# Patient Record
Sex: MALE | Race: WHITE | Employment: OTHER
[De-identification: names, ages, dates, MRNs, and addresses within clinical notes are randomized per-mention and may not be internally consistent; named-entity substitution may affect disease eponyms.]

---

## 2017-01-01 ENCOUNTER — HOME CARE VISIT (OUTPATIENT)
Dept: SCHEDULING | Facility: HOME HEALTH | Age: 77
End: 2017-01-01
Payer: MEDICARE

## 2017-01-01 ENCOUNTER — OFFICE VISIT (OUTPATIENT)
Dept: FAMILY MEDICINE CLINIC | Age: 77
End: 2017-01-01

## 2017-01-01 ENCOUNTER — TELEPHONE (OUTPATIENT)
Dept: FAMILY MEDICINE CLINIC | Age: 77
End: 2017-01-01

## 2017-01-01 ENCOUNTER — HOME HEALTH ADMISSION (OUTPATIENT)
Dept: HOME HEALTH SERVICES | Facility: HOME HEALTH | Age: 77
End: 2017-01-01
Payer: MEDICARE

## 2017-01-01 ENCOUNTER — HOME CARE VISIT (OUTPATIENT)
Dept: HOME HEALTH SERVICES | Facility: HOME HEALTH | Age: 77
End: 2017-01-01
Payer: MEDICARE

## 2017-01-01 ENCOUNTER — HOME CARE VISIT (OUTPATIENT)
Dept: HOME HEALTH SERVICES | Facility: HOME HEALTH | Age: 77
End: 2017-01-01

## 2017-01-01 ENCOUNTER — DOCUMENTATION ONLY (OUTPATIENT)
Dept: FAMILY MEDICINE CLINIC | Age: 77
End: 2017-01-01

## 2017-01-01 VITALS
HEART RATE: 119 BPM | TEMPERATURE: 97.6 F | OXYGEN SATURATION: 95 % | RESPIRATION RATE: 16 BRPM | SYSTOLIC BLOOD PRESSURE: 100 MMHG | DIASTOLIC BLOOD PRESSURE: 60 MMHG

## 2017-01-01 VITALS
RESPIRATION RATE: 20 BRPM | DIASTOLIC BLOOD PRESSURE: 60 MMHG | BODY MASS INDEX: 21.22 KG/M2 | OXYGEN SATURATION: 93 % | HEIGHT: 68 IN | SYSTOLIC BLOOD PRESSURE: 128 MMHG | WEIGHT: 140 LBS | HEART RATE: 73 BPM | TEMPERATURE: 98.1 F

## 2017-01-01 VITALS
DIASTOLIC BLOOD PRESSURE: 60 MMHG | TEMPERATURE: 97.5 F | RESPIRATION RATE: 12 BRPM | HEART RATE: 74 BPM | SYSTOLIC BLOOD PRESSURE: 100 MMHG | OXYGEN SATURATION: 96 %

## 2017-01-01 VITALS — SYSTOLIC BLOOD PRESSURE: 100 MMHG | TEMPERATURE: 97.5 F | DIASTOLIC BLOOD PRESSURE: 60 MMHG | HEART RATE: 74 BPM

## 2017-01-01 VITALS
RESPIRATION RATE: 20 BRPM | OXYGEN SATURATION: 95 % | HEART RATE: 87 BPM | TEMPERATURE: 98.1 F | SYSTOLIC BLOOD PRESSURE: 145 MMHG | DIASTOLIC BLOOD PRESSURE: 86 MMHG

## 2017-01-01 VITALS — TEMPERATURE: 97.3 F | HEART RATE: 67 BPM | DIASTOLIC BLOOD PRESSURE: 83 MMHG | SYSTOLIC BLOOD PRESSURE: 142 MMHG

## 2017-01-01 DIAGNOSIS — F33.42 RECURRENT MAJOR DEPRESSIVE DISORDER, IN FULL REMISSION (HCC): ICD-10-CM

## 2017-01-01 DIAGNOSIS — I10 ESSENTIAL HYPERTENSION: Primary | ICD-10-CM

## 2017-01-01 DIAGNOSIS — Z13.6 SCREENING FOR ISCHEMIC HEART DISEASE: ICD-10-CM

## 2017-01-01 DIAGNOSIS — Z79.899 HIGH RISK MEDICATION USE: ICD-10-CM

## 2017-01-01 DIAGNOSIS — F32.A DEPRESSION, UNSPECIFIED DEPRESSION TYPE: ICD-10-CM

## 2017-01-01 DIAGNOSIS — Z91.81 AT HIGH RISK FOR FALLS: ICD-10-CM

## 2017-01-01 DIAGNOSIS — R00.0 TACHYCARDIA: ICD-10-CM

## 2017-01-01 DIAGNOSIS — Z13.1 SCREENING FOR DIABETES MELLITUS: ICD-10-CM

## 2017-01-01 DIAGNOSIS — Z13.39 SCREENING FOR ALCOHOLISM: ICD-10-CM

## 2017-01-01 DIAGNOSIS — R25.1 TREMOR: ICD-10-CM

## 2017-01-01 DIAGNOSIS — I25.10 CORONARY ARTERY DISEASE INVOLVING NATIVE CORONARY ARTERY OF NATIVE HEART WITHOUT ANGINA PECTORIS: ICD-10-CM

## 2017-01-01 DIAGNOSIS — I10 ESSENTIAL HYPERTENSION: ICD-10-CM

## 2017-01-01 DIAGNOSIS — J44.9 CHRONIC OBSTRUCTIVE PULMONARY DISEASE, UNSPECIFIED COPD TYPE (HCC): ICD-10-CM

## 2017-01-01 DIAGNOSIS — Z00.00 ROUTINE GENERAL MEDICAL EXAMINATION AT A HEALTH CARE FACILITY: Primary | ICD-10-CM

## 2017-01-01 DIAGNOSIS — Z23 ENCOUNTER FOR IMMUNIZATION: ICD-10-CM

## 2017-01-01 PROCEDURE — 3331090002 HH PPS REVENUE DEBIT

## 2017-01-01 PROCEDURE — 3331090001 HH PPS REVENUE CREDIT

## 2017-01-01 PROCEDURE — G0156 HHCP-SVS OF AIDE,EA 15 MIN: HCPCS

## 2017-01-01 PROCEDURE — G0299 HHS/HOSPICE OF RN EA 15 MIN: HCPCS

## 2017-01-01 PROCEDURE — 400013 HH SOC

## 2017-01-01 RX ORDER — SERTRALINE HYDROCHLORIDE 50 MG/1
50 TABLET, FILM COATED ORAL DAILY
Qty: 30 TAB | Refills: 0 | Status: SHIPPED | OUTPATIENT
Start: 2017-01-01 | End: 2017-01-01 | Stop reason: SDUPTHER

## 2017-01-01 RX ORDER — PRAVASTATIN SODIUM 20 MG/1
20 TABLET ORAL
COMMUNITY
End: 2017-01-01

## 2017-01-01 RX ORDER — ACETAMINOPHEN 325 MG/1
650 TABLET ORAL
COMMUNITY
End: 2017-01-01

## 2017-01-01 RX ORDER — LORAZEPAM 2 MG/1
0.5 TABLET ORAL EVERY 4 HOURS
Status: CANCELLED | OUTPATIENT
Start: 2017-01-01

## 2017-01-01 RX ORDER — AMLODIPINE BESYLATE 2.5 MG/1
TABLET ORAL DAILY
COMMUNITY
End: 2017-01-01 | Stop reason: ALTCHOICE

## 2017-01-01 RX ORDER — NEBIVOLOL 10 MG/1
TABLET ORAL DAILY
COMMUNITY
End: 2017-01-01 | Stop reason: SDUPTHER

## 2017-01-01 RX ORDER — NEBIVOLOL 5 MG/1
5 TABLET ORAL DAILY
Qty: 30 TAB | Refills: 1 | Status: SHIPPED | OUTPATIENT
Start: 2017-01-01 | End: 2017-01-01 | Stop reason: SDUPTHER

## 2017-01-01 RX ORDER — MORPHINE SULFATE 20 MG/ML
SOLUTION ORAL
Qty: 30 ML | Refills: 0 | Status: CANCELLED | OUTPATIENT
Start: 2017-01-01

## 2017-01-01 RX ORDER — SERTRALINE HYDROCHLORIDE 25 MG/1
25 TABLET, FILM COATED ORAL DAILY
Qty: 30 TAB | Refills: 2 | Status: SHIPPED | OUTPATIENT
Start: 2017-01-01

## 2017-01-01 RX ORDER — AMLODIPINE BESYLATE 100 %
POWDER (GRAM) MISCELLANEOUS
COMMUNITY
End: 2017-01-01 | Stop reason: CLARIF

## 2017-01-01 RX ORDER — NEBIVOLOL 5 MG/1
5 TABLET ORAL DAILY
Qty: 90 TAB | Refills: 1 | Status: SHIPPED | OUTPATIENT
Start: 2017-01-01

## 2017-04-07 NOTE — PROGRESS NOTES
Patient here for f/u on hospital discharge was admitted to [facility] on 3/19/17 for low BP was d/c on 3/20/17. Amlodipine was d/c at hospital. Patient concern that he shakes more than he used to.     1. Have you been to the ER, urgent care clinic since your last visit? Hospitalized since your last visit? Yes When: 3/19/17 Where: [facility] Reason for visit: low BP    2. Have you seen or consulted any other health care providers outside of the [address] [place] [address] since your last visit? Include any pap smears or colon screening.  No

## 2017-04-07 NOTE — PROGRESS NOTES
Bisi Delcid is a 77 y.o. male here with his daughter Mariana Aflaro and her Danelle Hopedale    [place] on [place] [facility]  [place]17-3/20/17 for transient hypotension. Amlodipine was d/c    Additional diagnoses:    Dementia with altered mental status, resolved  Tremors: Parkinson's disease versus essential tremor  CKD stage III unchanged  Hypertension: Plan at discharge was to continue nebivolol  COPD: Stable  History of seizure disorder but off medications for 2 years    Discharge medications (in contrast what is reported by the family below):  Nebivolol 10 mg tablet 1 tablet daily  Pravastatin 20 mg daily  Primidone 50 mg tablet, 1.5 tablet daily  Tamsulosin 0.4 mg capsule extended release daily  Spiriva 18 µg capsule daily    Mariana Alfaro also indicates taking:  gabapentin for nerve pain  zoloft for depression  Albuterol both by MDI and nebulizer  Using Advair \"as needed\"  Suspect [facility]'s actually [facility] indicates a reference to Dr. Dre Graham, neurologist who follows him for a diagnosis of severe central tremors on primidone      \"I don't feel good. \"  No energy  Poor appetite  Stable since discharge     Patient concern that he shakes more than he used to. Soc: transportation issues have improved      Patient Care Team:  Trish Harrington MD as PCP - Shon Parekr MD (Cardiology)  Davie Wharton MD (Nephrology)  Kristen Jacobson MD (Neurology)  No Known Allergies  Outpatient Prescriptions Marked as Taking for the 4/7/17 encounter (Office Visit) with Trish Harrington MD   Medication Sig Dispense Refill    acetaminophen (TYLENOL) 325 mg tablet Take 650 mg by mouth every six (6) hours as needed for Pain.  nebivolol (BYSTOLIC) 10 mg tablet Take  by mouth daily.  pravastatin (PRAVACHOL) 20 mg tablet Take 20 mg by mouth nightly.       atorvastatin (LIPITOR) 40 mg tablet take 1 tablet by mouth once daily 90 Tab 4    sertraline (ZOLOFT) 50 mg tablet Take 1 Tab by mouth daily. 90 Tab 4    metoprolol tartrate (LOPRESSOR) 50 mg tablet Take 1 Tab by mouth two (2) times a day. 180 Tab 4    tamsulosin (FLOMAX) 0.4 mg capsule take 2 capsules by mouth once daily 180 Cap 4    guaiFENesin SR (MUCINEX) 600 mg SR tablet Take 600 mg by mouth two (2) times a day.  ketoconazole (NIZORAL) 2 % topical cream Apply  to affected area two (2) times a day. Indications: FUNGAL INFECTION OF SKIN 15 g 0    esomeprazole (NEXIUM) 20 mg capsule Take  by mouth daily.  albuterol (PROVENTIL HFA, VENTOLIN HFA, PROAIR HFA) 90 mcg/actuation inhaler Take 2 Puffs by inhalation every four (4) hours as needed. 1 Inhaler 4    gabapentin (NEURONTIN) 300 mg capsule Take 2 Caps by mouth nightly. 180 Cap 0    primidone (MYSOLINE) 50 mg tablet Take 1.5 Tabs by mouth two (2) times a day. (Patient taking differently: Take 100 mg by mouth two (2) times a day.) 180 Tab 0    clopidogrel (PLAVIX) 75 mg tablet   0    tiotropium (SPIRIVA WITH HANDIHALER) 18 mcg inhalation capsule Take 1 Cap by inhalation daily. 90 Cap 4    fluticasone-salmeterol (ADVAIR DISKUS) 250-50 mcg/dose diskus inhaler Take 1 Puff by inhalation every twelve (12) hours. 3 Inhaler 4    Inhalational Spacing Device (AEROCHAMBER) 1 Each by Does Not Apply route as needed. 1 Device 0     Patient Active Problem List    Diagnosis    Advanced care planning/counseling discussion    Presence of stent in coronary artery in patient with coronary artery disease    EVELYN (obstructive sleep apnea)    Neck pain    Tremor     4/7/2017: Abstracted from hospital discharge summary [facility] discharge date 3/20/2017 severe central tremors followed by Dr. Anibal Be.   On primidone      Periodic limb movement sleep disorder    Ataxic gait    Polyneuropathy    Memory loss    Carotid stenosis, bilateral    Sleep disorder    Hyponatremia    Phase of life or life circumstance problem    Hoarseness or changing voice    ETOH abuse    Tobacco abuse    Erectile dysfunction of organic origin    Chronic renal failure     Stage III      Peripheral neuropathy (HCC)     alcoholic      Hyperlipidemia LDL goal <70    Restless legs syndrome (RLS)    Allergic rhinitis    Carotid atherosclerosis    Kidney stones     Recurrent      HTN (hypertension)    CAD (coronary artery disease)    COPD (chronic obstructive pulmonary disease) ([facility]     PFTs 9/28/2012       Past Medical History:   Diagnosis Date    Bilateral carotid artery stenosis     right carotid occluded. left stenosis at 50% followed by Dr. Mela Mensah CAD (coronary artery disease) 4/89,6/90    acute MI    COPD     Diabetes mellitus     \"prediabetes\"    Hypercholesterolemia     Hypertension     Pneumonia     3/12 hospitalized at [facility] Pneumonia     Stroke [facility]) 7/09    branch retinal artery occlusion left eye    TIA (transient ischemic attack) 5/8/2015     Past Surgical History:   Procedure Laterality Date    CARDIAC SURG PROCEDURE UNLIST      CHEST SURGERY PROCEDURE UNLISTED  1997    right sided lung biopsy benign    HX HEENT  2005    cataract    VASCULAR SURGERY PROCEDURE UNLIST       Family History   Problem Relation Age of Onset    MS Son     Coronary Artery Disease Mother     Diabetes Mother     Cancer Mother     Heart Disease Mother     Coronary Artery Disease Sister     Heart Disease Sister     Coronary Artery Disease Brother     Cancer Brother      prostate    Heart Disease Brother     Coronary Artery Disease Brother     Parkinsonism Maternal Grandfather      Social History     Social History    Marital status:      Spouse name: N/A    Number of children: N/A    Years of education: N/A     Occupational History    Not on file.      Social History Main Topics    Smoking status: Former Smoker     Packs/day: 2.00     Years: 27.00     Types: Cigarettes     Quit date: 3/25/1980    Smokeless tobacco: Former User Types: Jennifer Corbin     Quit date: 3/25/1980      Comment: still chews 3 containers/week (20 doses each)    Alcohol use No      Comment: quit drinking 1-2 months ago    Drug use: No    Sexual activity: Not on file     Other Topics Concern    Not on file     Social History Narrative         Review of Systems   Constitutional: Negative for fever. Gastrointestinal: Negative for abdominal pain, diarrhea, nausea and vomiting. Genitourinary: Negative for hematuria. Neurological: Negative for loss of consciousness. Visit Vitals    /60 (BP 1 Location: Left arm, BP Patient Position: Sitting)    Pulse (!) 119    Temp 97.6 °F (36.4 °C) (Temporal)    Resp 16    SpO2 95%     Physical Exam   Constitutional: He is oriented to person, place, and time. He appears well-developed. No distress. Pleasant chronically ill-appearing elderly white male in wheelchair   HENT:   Head: Normocephalic and atraumatic. Cardiovascular: Regular rhythm. Tachycardia present. Exam reveals distant heart sounds. No murmur heard. Pulmonary/Chest: Effort normal. No respiratory distress. He has no wheezes. He has no rales. Musculoskeletal: He exhibits no edema. Neurological: He is alert and oriented to person, place, and time. Skin: Skin is warm and dry. Psychiatric: He has a normal mood and affect. His behavior is normal. Judgment and thought content normal.     Results for orders placed or performed in visit on 06/23/16   AMB EXT CREATININE   Result Value Ref Range    Creatinine, External 1.12        ICD-10-CM ICD-9-CM    1. Essential hypertension I10 401.9    2. Tachycardia R00.0 785.0    3. Tremor R25.1 781.0    4. Chronic obstructive pulmonary disease, unspecified COPD type ([facility] J44.9 496      I have asked Mg Wiggins to ensure that they bring all of his medications to revisit.   Meanwhile today, after they get home, she understands she is to line up all of the bottles and compare them to what is noted in the after visit summary. I have asked her to please call the clinic regardless with the results of her findings. Medication change today to decrease the nebivolol from 10 mg to 5 mg daily is based on the assumption that he is only taking the nebivolol and not taking Lopressor in contradiction to her report at the time of medication reconciliation/rooming. The stability of his symptoms since discharge argues against infection. There is a very good chance that improving his blood pressure will result in resolution of the tachycardia and improvement in his appetite. Schedule visit with Dr. Maria L Vazquez to reassess management of tremors    Plan to [facility] that Advair is not a rescue medication. It is to be administered on a scheduled basis every day, like the Spiriva, to prevent symptoms rather than to acutely address them. Mr. Brittfredi Edmond and [facility] understand our medical plan. Alternatives have been explained and offered. The risks, benefits and significant side effects of all medications have been reviewed. All questions have been answered. He is encouraged to employ the information provided in the after visit summary, which was reviewed. [facility] is to call if his condition worsens or fails to improve or if significant side effects are experienced. Follow-up Disposition:  Return in about 1 month (around 5/7/2017) for blood pressure follow up, Medicare Wellness Visit same day, (30). Dragon medical dictation software was used for portions of this report. Unintended voice recognition errors may occur.

## 2017-04-07 NOTE — PATIENT INSTRUCTIONS
Changes today are assuming we are correct that he's only taking Bystolic 10 mg once daily. Schedule visit with Dr. Kasia Parra for tremors    Bring all bottles to every visit.

## 2017-05-12 NOTE — TELEPHONE ENCOUNTER
This patient's wife and daughter contacted office for the following prescriptions to be filled:  (They state he is completely out of this medication)    Medication requested :   Requested Prescriptions     Pending Prescriptions Disp Refills    sertraline (ZOLOFT) 50 mg tablet 90 Tab 4     Sig: Take 1 Tab by mouth daily.      PCP: Dr. Nathan Huff or Print: [facility]  Mail order or Local pharmacy: [phone]    Scheduled appointment if not seen by current providers in office:LOV 4/7/17, next appt 5/19/17

## 2017-05-12 NOTE — TELEPHONE ENCOUNTER
Last RX for Zoloft was 06/30/16 #90 with 4 refills. Patients caregivers state he is completely out. Patient has a medicare wellness check on 05/19/17.  Please advise

## 2017-05-12 NOTE — TELEPHONE ENCOUNTER
Please make sure that he is also scheduled on that same day for blood pressure follow-up (30). Barring there are no additional unforeseen problems, we can plan to follow-up on his depression management at that time. Please make note on the reason for visit so we'll be sure to attend to it that day.   SAMMI

## 2017-05-19 NOTE — ACP (ADVANCE CARE PLANNING)
Advance Care Planning (ACP) Provider Note - Comprehensive     Date of ACP Conversation: 05/19/17  Persons included in Conversation:  patient and family  Length of ACP Conversation in minutes:  <16 minutes (Non-Billable)    Hans Fry (step son) by Serg VALDEZ. General ACP for ALL Patients with Decision Making Capacity:   Exploration of values, goals, and preferences if recovery is not expected, even with continued medical treatment in the event of: Imminent death  Severe, permanent brain injury  \"In these circumstances, what matters most to you? \"  Care focused more on comfort or quality of life. \"What, if any, treatments would you want to avoid? \" chronic intubation     For Serious or Chronic Illness: For Serious or Chronic Illness: The following items were discussed with the patient who verbalized understanding:    Understanding of CPR, goals and expected outcomes, benefits and burdens discussed.   Information on CPR success rates provided (e.g. for CPR in hospital, survival to d/c at two weeks is 22%)    Interventions Provided:  Recommended communicating the plan and making copies for the healthcare agent, personal physician, and others as appropriate (e.g., health system)  SAMMI

## 2017-05-19 NOTE — MR AVS SNAPSHOT
Visit Information Date & Time Provider Department Dept. Phone Encounter #  
 5/19/2017 11:00 AM Aaliyah Tatum MD [facility] [phone] [id number] Follow-up Instructions Return in about 1 year (around 5/19/2018) for Medicare Wellness Visit. Upcoming Health Maintenance Date Due  
 GLAUCOMA SCREENING Q2Y 6/30/2006 MEDICARE YEARLY EXAM 6/30/2006 Pneumococcal 65+ High/Highest Risk (2 of 2 - PPSV23) 2/5/2016 INFLUENZA AGE 9 TO ADULT 8/1/2017 DTaP/Tdap/Td series (2 - Td) 5/12/2023 Allergies as of 5/19/2017  Review Complete On: 5/19/2017 By: Aaliyah Tatum MD  
 No Known Allergies Current Immunizations  Reviewed on 5/26/2015 Name Date Influenza Vaccine Split 10/26/2011 Pneumococcal Conjugate (PCV-13)  Incomplete, 12/11/2015 TD Vaccine 7/25/2010 Tdap 5/12/2013  5:07 PM  
  
 Not reviewed this visit You Were Diagnosed With   
  
 Codes Comments Routine general medical examination at a health care facility    -  Primary ICD-10-CM: Z00.00 ICD-9-CM: V70.0 Screening for alcoholism     ICD-10-CM: Z13.89 ICD-9-CM: V79.1 At high risk for falls     ICD-10-CM: Z91.81 
ICD-9-CM: V15.88 High risk medication use     ICD-10-CM: Z79.899 ICD-9-CM: V58.69 Essential hypertension     ICD-10-CM: I10 
ICD-9-CM: 401.9 Coronary artery disease involving native coronary artery of native heart without angina pectoris     ICD-10-CM: I25.10 ICD-9-CM: 414.01 Encounter for immunization     ICD-10-CM: Y19 ICD-9-CM: V03.89 Screening for diabetes mellitus     ICD-10-CM: Z13.1 ICD-9-CM: V77.1 Screening for ischemic heart disease     ICD-10-CM: Z13.6 ICD-9-CM: V81.0 Vitals BP Pulse Temp Smoking Status 100/60 (BP 1 Location: Left arm, BP Patient Position: Sitting) 74 97.5 °F (36.4 °C) (Oral) Former Smoker Preferred Pharmacy Pharmacy Name Phone [facility] [address] [place] [address], [address] [place] [address] Rd [phone] Your Updated Medication List  
  
   
This list is accurate as of: 5/19/17 12:04 PM.  Always use your most recent med list.  
  
  
  
  
 * albuterol 2.5 mg /3 mL (0.083 %) nebulizer solution Commonly known as:  PROVENTIL VENTOLIN  
3 mL by Nebulization route every four (4) hours as needed for Wheezing. * albuterol 90 mcg/actuation inhaler Commonly known as:  PROVENTIL HFA, VENTOLIN HFA, PROAIR HFA Take 2 Puffs by inhalation every four (4) hours as needed. atorvastatin 40 mg tablet Commonly known as:  LIPITOR  
take 1 tablet by mouth once daily  
  
 clopidogrel 75 mg Tab Commonly known as:  PLAVIX  
  
 COL-RITE 100 mg capsule Generic drug:  docusate sodium Take 100 mg by mouth nightly. fluticasone-salmeterol 250-50 mcg/dose diskus inhaler Commonly known as:  ADVAIR DISKUS Take 1 Puff by inhalation every twelve (12) hours. gabapentin 300 mg capsule Commonly known as:  NEURONTIN Take 2 Caps by mouth nightly. guaiFENesin  mg SR tablet Commonly known as:  [place] [address] Take 600 mg by mouth two (2) times a day. inhalational spacing device Commonly known as:  AEROCHAMBER  
1 Each by Does Not Apply route as needed. nebivolol 5 mg tablet Commonly known as:  BYSTOLIC Take 1 Tab by mouth daily. Nebulizer & Compressor machine 1 Each by Does Not Apply route as directed. NexIUM 20 mg capsule Generic drug:  esomeprazole Take  by mouth daily. nitroglycerin 0.4 mg SL tablet Commonly known as:  NITROSTAT  
1 Tab by SubLINGual route every five (5) minutes as needed. Up to 3 doses. Then call 911 if continues to have pain. primidone 50 mg tablet Commonly known as: MYSOLINE Take 1.5 Tabs by mouth two (2) times a day. sertraline 25 mg tablet Commonly known as:  ZOLOFT Take 1 Tab by mouth daily. SLEEP AID (DOXYLAMINE) PO Take  by mouth. tamsulosin 0.4 mg capsule Commonly known as:  FLOMAX  
take 2 capsules by mouth once daily  
  
 tiotropium 18 mcg inhalation capsule Commonly known as:  [address] Take 1 Cap by inhalation daily. * Notice: This list has 2 medication(s) that are the same as other medications prescribed for you. Read the directions carefully, and ask your doctor or other care provider to review them with you. We Performed the Following ADMIN PNEUMOCOCCAL VACCINE [ [facility]] PNEUMOCOCCAL CONJ VACCINE 13 VALENT IM [id number] CPT(R)] [address] Comments:  
 Homebound patient unable to attend to ADLs. High risk for falls Multiple admissions in the past 2 years. Please perform home safety assessment and ameliorate. Please initiate home PT/OT. Also having teouble understanding proper use of medications and therefore not complying with therapy. Follow-up Instructions Return in about 1 year (around 5/19/2018) for Medicare Wellness Visit. To-Do List   
 05/19/2017 Lab:  GLUCOSE, RANDOM   
  
 05/19/2017 Lab:  LIPID PANEL   
  
 05/19/2017 Lab:  METABOLIC PANEL, BASIC Referral Information Referral ID Referred By Referred To  
  
 [id number] Ingris Hayden Not Available Visits Status Start Date End Date 1 New Request 5/19/17 5/19/18 If your referral has a status of pending review or denied, additional information will be sent to support the outcome of this decision. Patient Instructions Medicare Wellness Visit, Male The best way to improve and maintain good health is to have a healthy lifestyle by eating a well-balanced diet, exercising regularly, limiting alcohol and stopping smoking. Regular visits with your physician or non-physician health care provider also support your good health.  Preventive screening tests can find health problems before they become diseases or illnesses. Preventive services such as immunizations prevent serious infections. All people over age 72 should have a Pneumovax and a Prevnar-13 shot to prevent potentially life threatening infections with the pneumococcus bacteria, a common cause of pneumonia. These are once in a lifetime unless you and your provider decide differently. All people over 65 should have a yearly influenza vaccine or \"flu\" shot. This does not prevent infection with cold viruses but has been proven to prevent hospitalization and death from influenza. Although Medicare part B \"regular Medicare\" currently only covers tetanus vaccination in the context of an injury, a tetanus vaccine (Tdap or Td) is recommended every 10 years. A shingles vaccine is recommended once in a lifetime after age 61. The Shingles vaccine is also not covered by Medicare part B. Note, however, that both the Shingles vaccine and Tdap/Td are generally covered by secondary carriers. Please check your coverage and out of pocket expenses. Consider contacting your local health department because it may stock these vaccines for a reasonable charge. We currently have documentation of the following immunization history for you: 
Immunization History Administered Date(s) Administered  Influenza Vaccine Split 10/26/2011  Pneumococcal Conjugate (PCV-13) 12/11/2015  TD Vaccine 07/25/2010  Tdap 05/12/2013 Screening for infection with Hepatitis C is recommended for anyone born between [birth year] through [address]. The table at the bottom of this document indicates the status of this and other screening services. Screening for diabetes mellitus with a blood sugar test (glucose) should be done at least every 3 years until age 79. You and your health care provider may decide whether to continue screening after age 79. The most recent blood glucose we have on file for you is:  
Lab Results Component Value Date/Time Glucose 99 12/31/2014 03:30 PM  
 
 
Glaucoma is a disease of the eye due to increased ocular pressure that can lead to blindness. People with risk factors for glaucoma ( race, diabetes, family history) should consider screening at least every 2 years by an eye professional. This may be covered annually if indicated as determined by you and your doctor. Cardiovascular screening tests that check for elevated lipids or cholesterol (fatty part of blood) which can lead to heart disease and strokes should be done every 4-6 years through age 79. You and your health care provider may decide whether to continue screening after age 79. The most recent lipid panel we have on file for you is:  
Lab Results Component Value Date/Time Cholesterol, total 173 02/22/2012 08:35 AM  
 HDL Cholesterol 60 02/22/2012 08:35 AM  
 LDL, calculated 60 02/22/2012 08:35 AM  
 VLDL, calculated 53 02/22/2012 08:35 AM  
 Triglyceride 265 02/22/2012 08:35 AM  
 CHOL/HDL Ratio 2.4 10/06/2010 08:46 AM  
 
 
Colorectal cancer screening that evaluates for blood or polyps in your colon for people with average risk should be done yearly as a stool test, every five years as a flexible sigmoidoscope or every 10 years as a colonoscopy up to age 76. You and your health care provider may decide whether to continue screening after age 76. Men up to age 76 may elect to screen for prostate cancer with a blood test called a PSA at certain intervals, depending on their personal and family history. This decision is between the patient and his provider. The most recent PSA values we have on file for you are: No results found for: PSA, Michelle Singer, PSAR3, [id number], [id number], PSALT You have declined, which is reasonable If you have been a smoker or had family history of abdominal aortic aneurysms, you and your provider may decide to schedule an ultrasound test of your aorta. Our records show this was done on:  
 
People who have smoked the equivalent of 1 pack per day for 30 years or more may benefit from screening for lung cancer with a yearly low dose CT scan until they have been non smokers for 15 years or competing health conditions render this unlikely to be beneficial. You do not need this because you quit so long ago. Your Medicare Wellness Exam is recommended annually. Here is a list of your current Health Maintenance items with a due date: 
Health Maintenance Topic Date Due  GLAUCOMA SCREENING Q2Y  06/30/2006  MEDICARE YEARLY EXAM  06/30/2006  HEMOGLOBIN A1C Q6M  06/30/2015  
 FOOT EXAM Q1  12/31/2015  MICROALBUMIN Q1  12/31/2015  
 EYE EXAM RETINAL OR DILATED Q1  12/31/2015  Pneumococcal 65+ High/Highest Risk (2 of 2 - PPSV23) 02/05/2016  LIPID PANEL Q1  05/09/2016  INFLUENZA AGE 9 TO ADULT  08/01/2017  
 DTaP/Tdap/Td series (2 - Td) 05/12/2023  ZOSTER VACCINE AGE 60>  Completed Please provide copies of your advanced care documents Introducing [facility] SERVICES! Sabine Peggy introduces [employer] patient portal. Now you can access parts of your medical record, email your doctor's office, and request medication refills online. 1. In your internet browser, go to [email or web address] 2. Click on the First Time User? Click Here link in the Sign In box. You will see the New Member Sign Up page. 3. Enter your [employer] Access Code exactly as it appears below. You will not need to use this code after youve completed the sign-up process. If you do not sign up before the expiration date, you must request a new code. · [employer] Access Code: [email or web address] Expires: 8/17/2017 11:54 AM 
 
4. Enter the last four digits of your Social Security Number (xxxx) and Date of Birth (mm/dd/yyyy) as indicated and click Submit. You will be taken to the next sign-up page. 5. Create a [employer] ID. This will be your [employer] login ID and cannot be changed, so think of one that is secure and easy to remember. 6. Create a [employer] password. You can change your password at any time. 7. Enter your Password Reset Question and Answer. This can be used at a later time if you forget your password. 8. Enter your e-mail address. You will receive e-mail notification when new information is available in [address] 9. Click Sign Up. You can now view and download portions of your medical record. 10. Click the Download Summary menu link to download a portable copy of your medical information. If you have questions, please visit the Frequently Asked Questions section of the [employer] website. Remember, [employer] is NOT to be used for urgent needs. For medical emergencies, dial 911. Now available from your iPhone and Android! Please provide this summary of care documentation to your next provider. Your primary care clinician is listed as Lionel Gonzalez. If you have any questions after today's visit, please call [phone].

## 2017-05-19 NOTE — PATIENT INSTRUCTIONS
Medicare Wellness Visit, Male    The best way to improve and maintain good health is to have a healthy lifestyle by eating a well-balanced diet, exercising regularly, limiting alcohol and stopping smoking. Regular visits with your physician or non-physician health care provider also support your good health. Preventive screening tests can find health problems before they become diseases or illnesses. Preventive services such as immunizations prevent serious infections. All people over age 72 should have a Pneumovax and a Prevnar-13 shot to prevent potentially life threatening infections with the pneumococcus bacteria, a common cause of pneumonia. These are once in a lifetime unless you and your provider decide differently. All people over 65 should have a yearly influenza vaccine or \"flu\" shot. This does not prevent infection with cold viruses but has been proven to prevent hospitalization and death from influenza. Although Medicare part B \"regular Medicare\" currently only covers tetanus vaccination in the context of an injury, a tetanus vaccine (Tdap or Td) is recommended every 10 years. A shingles vaccine is recommended once in a lifetime after age 61. The Shingles vaccine is also not covered by Medicare part B. Note, however, that both the Shingles vaccine and Tdap/Td are generally covered by secondary carriers. Please check your coverage and out of pocket expenses. Consider contacting your local health department because it may stock these vaccines for a reasonable charge. We currently have documentation of the following immunization history for you:  Immunization History   Administered Date(s) Administered    Influenza Vaccine Split 10/26/2011    Pneumococcal Conjugate (PCV-13) 12/11/2015    TD Vaccine 07/25/2010    Tdap 05/12/2013       Screening for infection with Hepatitis C is recommended for anyone born between [birth year] through [address].  The table at the bottom of this document indicates the status of this and other screening services. Screening for diabetes mellitus with a blood sugar test (glucose) should be done at least every 3 years until age 79. You and your health care provider may decide whether to continue screening after age 79. The most recent blood glucose we have on file for you is:   Lab Results   Component Value Date/Time    Glucose 99 12/31/2014 03:30 PM       Glaucoma is a disease of the eye due to increased ocular pressure that can lead to blindness. People with risk factors for glaucoma ( race, diabetes, family history) should consider screening at least every 2 years by an eye professional. This may be covered annually if indicated as determined by you and your doctor. Cardiovascular screening tests that check for elevated lipids or cholesterol (fatty part of blood) which can lead to heart disease and strokes should be done every 4-6 years through age 79. You and your health care provider may decide whether to continue screening after age 79. The most recent lipid panel we have on file for you is:   Lab Results   Component Value Date/Time    Cholesterol, total 173 02/22/2012 08:35 AM    HDL Cholesterol 60 02/22/2012 08:35 AM    LDL, calculated 60 02/22/2012 08:35 AM    VLDL, calculated 53 02/22/2012 08:35 AM    Triglyceride 265 02/22/2012 08:35 AM    CHOL/HDL Ratio 2.4 10/06/2010 08:46 AM       Colorectal cancer screening that evaluates for blood or polyps in your colon for people with average risk should be done yearly as a stool test, every five years as a flexible sigmoidoscope or every 10 years as a colonoscopy up to age 76. You and your health care provider may decide whether to continue screening after age 76. Men up to age 76 may elect to screen for prostate cancer with a blood test called a PSA at certain intervals, depending on their personal and family history. This decision is between the patient and his provider.  The most recent PSA values we have on file for you are:  No results found for: PSA, PSA2, Alexa Loya, [id number], [id number], PSALT  You have declined, which is reasonable    If you have been a smoker or had family history of abdominal aortic aneurysms, you and your provider may decide to schedule an ultrasound test of your aorta. Our records show this was done on:     People who have smoked the equivalent of 1 pack per day for 30 years or more may benefit from screening for lung cancer with a yearly low dose CT scan until they have been non smokers for 15 years or competing health conditions render this unlikely to be beneficial. You do not need this because you quit so long ago. Your Medicare Wellness Exam is recommended annually.     Here is a list of your current Health Maintenance items with a due date:  Health Maintenance   Topic Date Due    GLAUCOMA SCREENING Q2Y  06/30/2006    MEDICARE YEARLY EXAM  06/30/2006    HEMOGLOBIN A1C Q6M  06/30/2015    FOOT EXAM Q1  12/31/2015    MICROALBUMIN Q1  12/31/2015    EYE EXAM RETINAL OR DILATED Q1  12/31/2015    Pneumococcal 65+ High/Highest Risk (2 of 2 - PPSV23) 02/05/2016    LIPID PANEL Q1  05/09/2016    INFLUENZA AGE 9 TO ADULT  08/01/2017    DTaP/Tdap/Td series (2 - Td) 05/12/2023    ZOSTER VACCINE AGE 60>  Completed         Please provide copies of your advanced care documents

## 2017-05-19 NOTE — PROGRESS NOTES
Patient brought medication bottles to his appointment today. Dr. Tamra Sanchez went over medications with patient. Patient had Amlodipine Besylate 2.5 mg tablets 90 blue [place] pills. Per Dr. Tamra Sanchez dispose of tablets. Put pills in the [employer] box witnessed by Jose Armando Peña LPN.

## 2017-05-19 NOTE — PROGRESS NOTES
1. Have you been to the ER, urgent care clinic since your last visit? Hospitalized since your last visit? No    2. Have you seen or consulted any other health care providers outside of the [address] [place] [address] since your last visit? Include any pap smears or colon screening. No    This is a Subsequent Medicare Annual Wellness Visit providing Personalized Prevention Plan Services (PPPS) (Performed 12 months after initial AWV and PPPS )    I have reviewed the patient's medical history in detail and updated the computerized patient record. History     Past Medical History:   Diagnosis Date    Bilateral carotid artery stenosis     right carotid occluded. left stenosis at 50% followed by Dr. Florecita Pina CAD (coronary artery disease) 4/89,6/90    acute MI    COPD     Diabetes mellitus     \"prediabetes\"    Hypercholesterolemia     Hypertension     Pneumonia     3/12 hospitalized at [facility] Pneumonia     Stroke [facility]) 7/09    branch retinal artery occlusion left eye    TIA (transient ischemic attack) 5/8/2015      Past Surgical History:   Procedure Laterality Date    CARDIAC SURG PROCEDURE UNLIST     Patrick Maza 89    right sided lung biopsy benign    HX HEENT  2005    cataract    VASCULAR SURGERY PROCEDURE UNLIST       Current Outpatient Prescriptions   Medication Sig Dispense Refill    DOXYLAMINE SUCCINATE (SLEEP AID, DOXYLAMINE, PO) Take  by mouth.  amLODIPine (NORVASC) 2.5 mg tablet Take  by mouth daily.  sertraline (ZOLOFT) 50 mg tablet Take 1 Tab by mouth daily. 30 Tab 0    nebivolol (BYSTOLIC) 5 mg tablet Take 1 Tab by mouth daily. 30 Tab 1    atorvastatin (LIPITOR) 40 mg tablet take 1 tablet by mouth once daily 90 Tab 4    tamsulosin (FLOMAX) 0.4 mg capsule take 2 capsules by mouth once daily 180 Cap 4    guaiFENesin SR (MUCINEX) 600 mg SR tablet Take 600 mg by mouth two (2) times a day.       esomeprazole (NEXIUM) 20 mg capsule Take  by mouth daily.      gabapentin (NEURONTIN) 300 mg capsule Take 2 Caps by mouth nightly. 180 Cap 0    primidone (MYSOLINE) 50 mg tablet Take 1.5 Tabs by mouth two (2) times a day. (Patient taking differently: Take 100 mg by mouth two (2) times a day.) 180 Tab 0    acetaminophen (TYLENOL) 325 mg tablet Take 650 mg by mouth every six (6) hours as needed for Pain.  ketoconazole (NIZORAL) 2 % topical cream Apply  to affected area two (2) times a day. Indications: FUNGAL INFECTION OF SKIN 15 g 0    nitroglycerin (NITROSTAT) 0.4 mg SL tablet 1 Tab by SubLINGual route every five (5) minutes as needed. Up to 3 doses. Then call 911 if continues to have pain. 1 Bottle 1    albuterol (PROVENTIL HFA, VENTOLIN HFA, PROAIR HFA) 90 mcg/actuation inhaler Take 2 Puffs by inhalation every four (4) hours as needed. 1 Inhaler 4    clopidogrel (PLAVIX) 75 mg tablet   0    tiotropium (SPIRIVA WITH HANDIHALER) 18 mcg inhalation capsule Take 1 Cap by inhalation daily. 90 Cap 4    fluticasone-salmeterol (ADVAIR DISKUS) 250-50 mcg/dose diskus inhaler Take 1 Puff by inhalation every twelve (12) hours. 3 Inhaler 4    albuterol (PROVENTIL VENTOLIN) 2.5 mg /3 mL (0.083 %) nebulizer solution 3 mL by Nebulization route every four (4) hours as needed for Wheezing. 24 Each 2    docusate sodium (COL-RITE) 100 mg capsule Take 100 mg by mouth nightly.  Nebulizer & Compressor machine 1 Each by Does Not Apply route as directed. 1 Each 0    Inhalational Spacing Device (AEROCHAMBER) 1 Each by Does Not Apply route as needed.  1 Device 0     No Known Allergies  Family History   Problem Relation Age of Onset   [facility] MS Son     Coronary Artery Disease Mother     Diabetes Mother    [facility] Cancer Mother     Heart Disease Mother     Coronary Artery Disease Sister     Heart Disease Sister     Coronary Artery Disease Brother     Cancer Brother      prostate    Heart Disease Brother     Coronary Artery Disease Brother     Parkinsonism Maternal Grandfather      Social History   Substance Use Topics    Smoking status: Former Smoker     Packs/day: 2.00     Years: 27.00     Types: Cigarettes     Quit date: 3/25/1980    Smokeless tobacco: Former User     Types: [address] date: 3/25/1980      Comment: still chews 3 containers/week (20 doses each)    Alcohol use No      Comment: quit drinking 1-2 months ago     Patient Active Problem List   Diagnosis Code    HTN (hypertension) I10    CAD (coronary artery disease) I25.10    COPD (chronic obstructive pulmonary disease) (HCC) J44.9    Restless legs syndrome (RLS) G25.81    Allergic rhinitis J30.9    Carotid atherosclerosis I65.29    Kidney stones N20.0    Hyperlipidemia LDL goal <70 E78.5    Peripheral neuropathy (HCC) G62.9    Chronic renal failure N18.9    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    ETOH abuse F10.10    Tobacco abuse Z72.0    Phase of life or life circumstance problem Z65.8    Hoarseness or changing voice R49.9    Hyponatremia E87.1    Ataxic gait R26.0    Polyneuropathy G62.9    Memory loss R41.3    Carotid stenosis, bilateral I65.23    Sleep disorder G47.9    Tremor R25.1    Periodic limb movement sleep disorder G47.61    Neck pain M54.2    EVELYN (obstructive sleep apnea) G47.33    Presence of stent in coronary artery in patient with coronary artery disease I25.10, Z95.5    Advanced care planning/counseling discussion Z71.89       Depression Risk Factor Screening:     PHQ over the last two weeks 5/19/2017   PHQ Not Done Active Diagnosis of Depression or Bipolar Disorder     Alcohol Risk Factor Screening: On any occasion during the past 3 months, have you had more than 4 drinks containing alcohol? No    Do you average more than 14 drinks per week? No      Functional Ability and Level of Safety:     Hearing Loss   Patient did not acknowledge that he heard the whisper. Activities of Daily Living   Total assistance.    Requires assistance with: ambulation, bathing and hygiene, feeding, continence, grooming, toileting and dressing    Fall Risk     Fall Risk Assessment, last 12 mths 5/19/2017   Able to walk? No   Fall in past 12 months? -   Number of falls in past 12 months -     Abuse Screen   Patient is not abused    Review of Systems   Not required    Physical Examination     Evaluation of Cognitive Function:  Mood/affect:  neutral  Appearance: age appropriate and within normal Limits  Family member/caregiver input: here with MYNOR Rey [employer]ues. No concerns expressed. Patient Care Team:  Ai Morris MD as PCP - Anthony Lopes MD (Cardiology)  Wily Desouza MD (Nephrology)  Halle Parra MD (Neurology)    Advice/Referrals/Counseling   Education and counseling provided:  Patient states he has an advanced directive filled out and will get a copy to the office. Assessment/Plan       ICD-10-CM ICD-9-CM    1. Routine general medical examination at a health care facility Z00.00 V70.0    2. Screening for alcoholism Z13.89 V79.1    3. At high risk for falls Z91.81 V15.88 [facility]   4. High risk medication use Z79.899 V58.69 REFERRAL TO HOME HEALTH   7. Encounter for immunization Z23 V03.89 ADMIN PNEUMOCOCCAL VACCINE      PNEUMOCOCCAL CONJ VACCINE 13 VALENT IM   8. Screening for diabetes mellitus Z13.1 V77.1 GLUCOSE, RANDOM   9. Screening for ischemic heart disease Z13.6 V81.0 LIPID PANEL     The patient understands our medical plan. Alternatives have been explained and offered. All questions have been answered. He is encouraged to employ the information provided in the after visit summary, which was reviewed. He is instructed to call the clinic if he has not been notified either by phone or through [address] with the results of his tests or with an appointment plan for any referrals within 1 week(s). No news is not good news; it's no news.      Follow-up Disposition:  Return in about 1 year (around 5/19/2018) for Medicare Wellness Visit.

## 2017-05-19 NOTE — PROGRESS NOTES
Patient here for f/u on his HTN he is asking for refills today. 1. Have you been to the ER, urgent care clinic since your last visit? Hospitalized since your last visit? No    2. Have you seen or consulted any other health care providers outside of the [address] [place] [address] since your last visit? Include any pap smears or colon screening.  No

## 2017-05-19 NOTE — PROGRESS NOTES
Linda Mendes is a 77 y.o. male here with his son in law, Dipti Bolton, and his grandson    1. Follow-up hypertension. At last visit noted to have decreased energy in conjunction with relatively low blood pressure and tachycardia. Beta-blocker dose was halved. Reports improved energy. That visit had occurred in the wake of her hospitalization at which point his amlodipine was discontinued. He brings his medication bottles with him today and amlodipine remains among them. 2.  Follow-up depression on sertraline. Asymptomatic. In last 2 weeks  How often low in spirits? Not at all  How often little interest or pleasure? Not at all    3. Follow-up coronary artery disease. At one point he had been on Plavix. This is not among his current medications. It is unclear whether the medication was discontinued or if he has simply been lost to follow-up. He recalls that at his most recent visit with Dr. Rubin Morse a one-year follow-up was advised. He does not recall when that was. 4.  Follow-up COPD. Not using any inhalers on a scheduled basis, only as needed. Soc: homebound    Patient Care Team:  Prasanna Harris MD as PCP - Lisbeth Vargas MD (Cardiology)  Carlos Ball MD (Nephrology)  Malka Lepe MD (Neurology)  No Known Allergies  Outpatient Prescriptions Marked as Taking for the 5/19/17 encounter (Office Visit) with Prasanna Harris MD   Medication Sig Dispense Refill    DOXYLAMINE SUCCINATE (SLEEP AID, DOXYLAMINE, PO) Take  by mouth.  amLODIPine (NORVASC) 2.5 mg tablet Take  by mouth daily.  sertraline (ZOLOFT) 50 mg tablet Take 1 Tab by mouth daily. 30 Tab 0    nebivolol (BYSTOLIC) 5 mg tablet Take 1 Tab by mouth daily.  30 Tab 1    atorvastatin (LIPITOR) 40 mg tablet take 1 tablet by mouth once daily 90 Tab 4    tamsulosin (FLOMAX) 0.4 mg capsule take 2 capsules by mouth once daily 180 Cap 4    guaiFENesin SR (MUCINEX) 600 mg SR tablet Take 600 mg by mouth two (2) times a day.      esomeprazole (NEXIUM) 20 mg capsule Take  by mouth daily.  gabapentin (NEURONTIN) 300 mg capsule Take 2 Caps by mouth nightly. 180 Cap 0    primidone (MYSOLINE) 50 mg tablet Take 1.5 Tabs by mouth two (2) times a day. (Patient taking differently: Take 100 mg by mouth two (2) times a day.) 180 Tab 0     Patient Active Problem List    Diagnosis    Advanced care planning/counseling discussion    Presence of stent in coronary artery in patient with coronary artery disease    EVELYN (obstructive sleep apnea)    Neck pain    Tremor     4/7/2017: Abstracted from hospital discharge summary [facility] discharge date 3/20/2017 severe central tremors followed by Dr. Maria L Lauren On primidone      Periodic limb movement sleep disorder    Ataxic gait    Polyneuropathy    Memory loss    Carotid stenosis, bilateral    Sleep disorder    Hyponatremia    Hoarseness or changing voice    History of ETOH abuse    Tobacco abuse    Erectile dysfunction of organic origin    Chronic renal failure     Stage III      Peripheral neuropathy (HCC)     alcoholic      Hyperlipidemia LDL goal <70    Restless legs syndrome (RLS)    Allergic rhinitis    Carotid atherosclerosis    Kidney stones     Recurrent      HTN (hypertension)    CAD (coronary artery disease)    COPD (chronic obstructive pulmonary disease) ([address]     PFTs 9/28/2012       Past Medical History:   Diagnosis Date    Bilateral carotid artery stenosis     right carotid occluded.  left stenosis at 50% followed by Dr. Dail Merlin CAD (coronary artery disease) 4/89,6/90    acute MI    COPD     Diabetes mellitus     \"prediabetes\"    Hypercholesterolemia     Hypertension     Pneumonia     3/12 hospitalized at [facility] Pneumonia     Stroke [facility]) 7/09    branch retinal artery occlusion left eye    TIA (transient ischemic attack) 5/8/2015     Past Surgical History:   Procedure Laterality Date    CARDIAC SURG PROCEDURE UNLIST      CHEST SURGERY PROCEDURE UNLISTED  1997    right sided lung biopsy benign    HX HEENT  2005    cataract    VASCULAR SURGERY PROCEDURE UNLIST       Family History   Problem Relation Age of Onset    MS Son     Coronary Artery Disease Mother     Diabetes Mother     Cancer Mother     Heart Disease Mother     Coronary Artery Disease Sister     Heart Disease Sister     Coronary Artery Disease Brother     Cancer Brother      prostate    Heart Disease Brother     Coronary Artery Disease Brother     Parkinsonism Maternal Grandfather      Social History     Social History    Marital status:      Spouse name: N/A    Number of children: N/A    Years of education: N/A     Occupational History    Not on file. Social History Main Topics    Smoking status: Former Smoker     Packs/day: 2.00     Years: 27.00     Types: Cigarettes     Quit date: 3/25/1980    Smokeless tobacco: Former User     Types: Chew     Quit date: 3/25/1980      Comment: still chews 3 containers/week (20 doses each)    Alcohol use No      Comment: quit drinking 1-2 months ago    Drug use: No    Sexual activity: Not on file     Other Topics Concern    Not on file     Social History Narrative         Review of Systems   Respiratory: Positive for shortness of breath (\"sometimes\"). Cardiovascular: Negative for chest pain. Visit Vitals    /60 (BP 1 Location: Left arm, BP Patient Position: Sitting)    Pulse 74    Temp 97.5 °F (36.4 °C) (Oral)    Resp 12    SpO2 96%     Physical Exam   Constitutional: He is oriented to person, place, and time. He appears well-developed. No distress. Pleasant elderly white male in a wheelchair. Appears in far better general condition than at our previous recent visits. HENT:   Head: Normocephalic and atraumatic. Pulmonary/Chest: Effort normal.   Neurological: He is alert and oriented to person, place, and time. Psychiatric: He has a normal mood and affect.  His behavior is normal. Judgment and thought content normal.     Results for orders placed or performed in visit on 06/23/16   AMB EXT CREATININE   Result Value Ref Range    Creatinine, External 1.12        ICD-10-CM ICD-9-CM    1. Essential hypertension I10 401.9    2. Recurrent major depressive disorder, in full remission ([facility] [[email or web address] 75.) F33.42 296.36 sertraline (ZOLOFT) 25 mg tablet   3. Coronary artery disease involving native coronary artery of native heart without angina pectoris I25.10 414.01    4. Chronic obstructive pulmonary disease, unspecified COPD type ([facility] [[email or web address] 75.) J44.9 496        Tachycardia: Resolved  Hypertension: Blood pressure remains low. Discarding his amlodipine. Continue nebivolol unchanged. CAD: Continue statin, aspirin, beta-blocker. Return to Dr. Laura Dickey. Plavix determination to be made by Dr. Laura Dickey. Can now dx MDD in absence in ETOH. Unclear if resolved or well controlled. Given options, electing trial reduced dose SSRI. If clearly declines, call to return to 50 mg dose. COPD: uncontrolled. Not using inhalers properly. Explained controller vs rescue. The patient understands our medical plan. Alternatives have been explained and offered. The risks, benefits and significant side effects of all medications have been reviewed. All questions have been answered. He is encouraged to employ the information provided in the after visit summary, which was reviewed. He is instructed to call the clinic if he has not been notified either by phone or through [address] with the results of his tests or with an appointment plan for any referrals within 1 week(s). No news is not good news; it's no news. The patient  is to call if his condition worsens or fails to improve or if significant side effects are experienced. Follow-up Disposition:  Return in about 3 months (around 8/19/2017) for blood pressure and depression follow up, (30).      Dragon medical dictation software was used for portions of this report. Unintended voice recognition errors may occur.

## 2017-05-22 NOTE — PROGRESS NOTES
Patient given Prevnar 13 injection. Patient tolerated procedure well. Injection given in the left arm.

## 2017-06-20 NOTE — TELEPHONE ENCOUNTER
Representative from [address] [place] [address] Vazquez called to ask if Dr. Natalya Wood would be willing to follow Mr. Anshul Jett under hospice. I asked Dr. Natalya Wood and she agreed. Information was given to the representative. She will be in touch with the office once she receives the consent forms back.

## 2017-06-21 NOTE — TELEPHONE ENCOUNTER
Des Gruber with [phone] called and is asking for the nurse to call her back at [phone] to confirm some diagnosis and discuss some medication orders.

## 2017-06-21 NOTE — TELEPHONE ENCOUNTER
[facility] nurse she needed clarification on DX. Patients wife stated he has Alzheimer, but nurse says notes say Parkinson and Dementia. Nettie Navarro was just calling for clarification. Also she stated she needs an order the the comfort care medications to keep in the house. The medications being requested are pended for you to review.

## 2017-06-22 NOTE — TELEPHONE ENCOUNTER
Dementia was noted on a hospital discharge summary. To my knowledge, the etiology of the dementia has not been determined. Severe central tremors previously evaluated and managed by Dr. Judd Hopper. I am confident it was determined that he does not have Parkinson's disease. I haven't seen any documentation on the terminal illness meriting enrollment in hospice or what symptoms the pended meds are for.    SAMMI

## 2017-06-23 NOTE — TELEPHONE ENCOUNTER
Called Antonina Fabian to inform her of Dr. Shemar Fernandes message. No answer left a message for a return call.

## 2017-06-26 NOTE — TELEPHONE ENCOUNTER
Called Fred Ribeiro and she will change the medication usage to Tremors. She is going to fax the notes and orders back to our office.

## 2017-07-28 NOTE — TELEPHONE ENCOUNTER
Mark Knutson called from [facility] to report the death of Mr. Davy Woodward. He  at 2:40pm 17. Message was given to the PCP provider, Dr. Cornelius Said.

## 2022-08-15 NOTE — TELEPHONE ENCOUNTER
It appears hospice order was generated by Dr. Elif Tomlinson in conjunction with his assessment at the time. Please obtain those notes. I'm reviewing the hospice orders. They include primidone 50 mg tabs 2 po bid for shortness of breath. I'm not familiar with this medication being used for that purpose. It had been prescribed for his tremors. Please inquire of the hospice nurse, Timbo Gallardo, if it's also useful for shortness of breath. If not, please advise her of the error to be corrected.      Thank you,  SAMMI Discussed above with Dr. Godinez